# Patient Record
Sex: FEMALE | Race: WHITE
[De-identification: names, ages, dates, MRNs, and addresses within clinical notes are randomized per-mention and may not be internally consistent; named-entity substitution may affect disease eponyms.]

---

## 2018-01-14 ENCOUNTER — HOSPITAL ENCOUNTER (EMERGENCY)
Dept: HOSPITAL 17 - PHED | Age: 23
Discharge: HOME | End: 2018-01-14
Payer: COMMERCIAL

## 2018-01-14 VITALS — WEIGHT: 160.94 LBS | HEIGHT: 64 IN | BODY MASS INDEX: 27.48 KG/M2

## 2018-01-14 VITALS
RESPIRATION RATE: 16 BRPM | OXYGEN SATURATION: 97 % | SYSTOLIC BLOOD PRESSURE: 131 MMHG | DIASTOLIC BLOOD PRESSURE: 87 MMHG | HEART RATE: 126 BPM | TEMPERATURE: 98.5 F

## 2018-01-14 VITALS
SYSTOLIC BLOOD PRESSURE: 110 MMHG | RESPIRATION RATE: 17 BRPM | HEART RATE: 88 BPM | DIASTOLIC BLOOD PRESSURE: 73 MMHG | OXYGEN SATURATION: 98 %

## 2018-01-14 DIAGNOSIS — N28.1: ICD-10-CM

## 2018-01-14 DIAGNOSIS — B34.9: ICD-10-CM

## 2018-01-14 DIAGNOSIS — R11.2: Primary | ICD-10-CM

## 2018-01-14 DIAGNOSIS — D72.829: ICD-10-CM

## 2018-01-14 DIAGNOSIS — K21.9: ICD-10-CM

## 2018-01-14 LAB
ALBUMIN SERPL-MCNC: 3.8 GM/DL (ref 3.4–5)
ALP SERPL-CCNC: 73 U/L (ref 45–117)
ALT SERPL-CCNC: 18 U/L (ref 10–53)
AST SERPL-CCNC: 13 U/L (ref 15–37)
BASOPHILS # BLD AUTO: 0.3 TH/MM3 (ref 0–0.2)
BASOPHILS NFR BLD: 2.3 % (ref 0–2)
BILIRUB SERPL-MCNC: 0.5 MG/DL (ref 0.2–1)
BUN SERPL-MCNC: 16 MG/DL (ref 7–18)
CALCIUM SERPL-MCNC: 8.9 MG/DL (ref 8.5–10.1)
CHLORIDE SERPL-SCNC: 106 MEQ/L (ref 98–107)
COLOR UR: YELLOW
CREAT SERPL-MCNC: 0.93 MG/DL (ref 0.5–1)
EOSINOPHIL # BLD: 0.1 TH/MM3 (ref 0–0.4)
EOSINOPHIL NFR BLD: 0.4 % (ref 0–4)
ERYTHROCYTE [DISTWIDTH] IN BLOOD BY AUTOMATED COUNT: 12.4 % (ref 11.6–17.2)
GFR SERPLBLD BASED ON 1.73 SQ M-ARVRAT: 74 ML/MIN (ref 89–?)
GLUCOSE SERPL-MCNC: 126 MG/DL (ref 74–106)
GLUCOSE UR STRIP-MCNC: (no result) MG/DL
HCO3 BLD-SCNC: 23.9 MEQ/L (ref 21–32)
HCT VFR BLD CALC: 47.2 % (ref 35–46)
HGB BLD-MCNC: 15.8 GM/DL (ref 11.6–15.3)
HGB UR QL STRIP: (no result)
KETONES UR STRIP-MCNC: (no result) MG/DL
LEUKOCYTE ESTERASE UR QL STRIP: (no result) /HPF (ref 0–5)
LIPASE: 106 U/L (ref 73–393)
LYMPHOCYTES # BLD AUTO: 0.4 TH/MM3 (ref 1–4.8)
LYMPHOCYTES NFR BLD AUTO: 2.9 % (ref 9–44)
MCH RBC QN AUTO: 29.7 PG (ref 27–34)
MCHC RBC AUTO-ENTMCNC: 33.4 % (ref 32–36)
MCV RBC AUTO: 88.8 FL (ref 80–100)
MONOCYTE #: 0.4 TH/MM3 (ref 0–0.9)
MONOCYTES NFR BLD: 3.4 % (ref 0–8)
NEUTROPHILS # BLD AUTO: 11.8 TH/MM3 (ref 1.8–7.7)
NEUTROPHILS NFR BLD AUTO: 91 % (ref 16–70)
NITRITE UR QL STRIP: (no result)
PLATELET # BLD: 275 TH/MM3 (ref 150–450)
PMV BLD AUTO: 9.2 FL (ref 7–11)
PROT SERPL-MCNC: 7.9 GM/DL (ref 6.4–8.2)
RBC # BLD AUTO: 5.31 MIL/MM3 (ref 4–5.3)
RBC #/AREA URNS HPF: (no result) /HPF (ref 0–3)
SODIUM SERPL-SCNC: 139 MEQ/L (ref 136–145)
SP GR UR STRIP: 1.02 (ref 1–1.03)
SQUAMOUS #/AREA URNS HPF: (no result) /HPF (ref 0–5)
URINE LEUKOCYTE ESTERASE: (no result)
WBC # BLD AUTO: 13 TH/MM3 (ref 4–11)

## 2018-01-14 PROCEDURE — 85025 COMPLETE CBC W/AUTO DIFF WBC: CPT

## 2018-01-14 PROCEDURE — 80053 COMPREHEN METABOLIC PANEL: CPT

## 2018-01-14 PROCEDURE — 74177 CT ABD & PELVIS W/CONTRAST: CPT

## 2018-01-14 PROCEDURE — 87804 INFLUENZA ASSAY W/OPTIC: CPT

## 2018-01-14 PROCEDURE — 96372 THER/PROPH/DIAG INJ SC/IM: CPT

## 2018-01-14 PROCEDURE — 96374 THER/PROPH/DIAG INJ IV PUSH: CPT

## 2018-01-14 PROCEDURE — 99285 EMERGENCY DEPT VISIT HI MDM: CPT

## 2018-01-14 PROCEDURE — 84703 CHORIONIC GONADOTROPIN ASSAY: CPT

## 2018-01-14 PROCEDURE — 83690 ASSAY OF LIPASE: CPT

## 2018-01-14 PROCEDURE — 81001 URINALYSIS AUTO W/SCOPE: CPT

## 2018-01-14 PROCEDURE — 96361 HYDRATE IV INFUSION ADD-ON: CPT

## 2018-01-14 NOTE — RADRPT
EXAM DATE/TIME:  01/14/2018 10:02 

 

[facility] COMPARISON:     

No previous studies available for comparison.

 

 

INDICATIONS :     

Nausea, vomiting and diarrhea.

                      

 

IV CONTRAST:     

90 cc Omnipaque 350 (iohexol) IV 

 

 

ORAL CONTRAST:      

No oral contrast ingested.

                      

 

RADIATION DOSE:     

12.50 CTDIvol (mGy) 

 

 

MEDICAL HISTORY :     

Gastroesophageal reflux disease.  

 

SURGICAL HISTORY :      

None. 

 

ENCOUNTER:      

Initial

 

ACUITY:      

1 day

 

PAIN SCALE:      

0/10

 

LOCATION:         

abdomen

 

TECHNIQUE:     

Volumetric scanning of the abdomen and pelvis was performed.  Using automated exposure control and ad
justment of the mA and/or kV according to patient size, radiation dose was kept as low as reasonably 
achievable to obtain optimal diagnostic quality images.  DICOM format image data is available electro
nically for review and comparison.  

 

FINDINGS:     

 

LOWER LUNGS:     

The visualized lower lungs are clear.

 

LIVER:     

Homogeneous density without lesion.  There is no dilation of the biliary tree.  No calcified gallston
es.

 

SPLEEN:     

Normal size without lesion.

 

PANCREAS:     

Within normal limits.

 

KIDNEYS:     

2.4 x 1.8 cm triangular-shaped intermediate density mass of the lateral midpole of the right kidney. 
Kidneys otherwise within normal limits. No evidence of hydronephrosis.

 

ADRENAL GLANDS:     

Within normal limits.

 

VASCULAR:     

There is no aortic aneurysm.

 

BOWEL/MESENTERY:     

No evidence of bowel dilatation. No free air or free fluid. Appendix within normal limits.

 

ABDOMINAL WALL:     

Within normal limits.

 

RETROPERITONEUM:     

There is no lymphadenopathy. 

 

BLADDER:     

No wall thickening or mass. 

 

REPRODUCTIVE:     

Within normal limits.

 

INGUINAL:     

There is no lymphadenopathy or hernia. 

 

MUSCULOSKELETAL:     

Within normal limits for patient age. 

 

CONCLUSION:     

1. No acute findings in the abdomen and pelvis.

2. 2 cm intermediate density mass in the midpole the right kidney. Most likely etiology is a complex 
cyst. Recommend a routine followup renal ultrasound for further evaluation.

 

 

 

 Anthony Morales MD on January 14, 2018 at 10:14           

Board Certified Radiologist.

 This report was verified electronically.

## 2018-01-14 NOTE — PD
HPI


Chief Complaint:  GI Complaint


Time Seen by Provider:  08:19


Travel History


International Travel<30 days:  No


Contact w/Intl Traveler<30days:  No


Traveled to known affect area:  No





History of Present Illness


HPI


23yo F presented to the ED with nausea, vomiting and diarrhea. Pt states that 

after dinner last night she started to feel nausea, but per her mother she is 

always nausea after eating. Pt took some Dramamine for relief and went to bed. 

About 1:00am, she awoke all of the sudden with vomiting and diarrhea. She 

reports at least 9 episodes of N/V and diarrhea since that time. She attempted 

to take Pepto Bismol but was unable to keep it down. She reports exposure to 

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 She also has 

a medical history of GERD, that is not treated. She reports chills, hot flashes

, headache and muscle aches. She denies any hematemesis, coffee ground emesis, 

hematochezia, melena, abdominal pain, cough, sore throat, lightheadedness  or 

dizziness. She also denies any exposure to abnormal foods.  





Modifying Factors: None


Associated Signs & Symptoms: Nausea, vomiting, diarrhea


Risk Factors: Contact with influenza





PFSH


Past Medical History


Cancer:  No


Diabetes:  No


Hepatitis:  No


Hiatal Hernia:  No


Immunizations Current:  Yes


Thyroid Disease:  No


Pregnant?:  Not Pregnant


LMP:  NOW





Past Surgical History


Pacemaker:  No





Social History


Alcohol Use:  No


Tobacco Use:  No





Allergies-Medications


(Allergen,Severity, Reaction):  


Coded Allergies:  


     No Known Allergies (Verified  Adverse Reaction, Unknown, 1/14/18)


Reported Meds & Prescriptions





Reported Meds & Active Scripts


Active


Reported


Birth Control Pills (Miscellaneous Medication)  Tab 1 Tab PO DAILY 








Review of Systems


Except as stated in HPI:  all other systems reviewed are Neg


General / Constitutional:  Positive: Chills


HENT:  Positive: Headaches


Gastrointestinal:  Positive: Nausea, Vomiting, Diarrhea, Indigestion





Physical Exam


Narrative


GENERAL: 23yo F who is well-developed and well nourished. Appears fatigued, in 

mild distress. Alert and oriented x3. 


SKIN: Warm and dry.


HEAD: Atraumatic. Normocephalic. 


NECK: Trachea midline. No JVD.  Supple.


ENT: Mucosa pink and moist.  Mild erythema with no exudates. No uvular edema. 

No uvular, palatal, or tonsillar deviation. Airway patent. 


CARDIOVASCULAR: Tachycardic with normal rhythm. 


RESPIRATORY: No accessory muscle use. Clear to auscultation. Breath sounds 

equal bilaterally. 


GASTROINTESTINAL: Abdomen soft, non-tender, nondistended. Hepatic and splenic 

margins not palpable. Active bowel sounds.  Benign.


MUSCULOSKELETAL: Extremities without clubbing, cyanosis, or edema. No obvious 

deformities. 


NEUROLOGICAL: Awake and alert. No obvious cranial nerve deficits.  Motor 

grossly within normal limits.  Normal speech.


PSYCHIATRIC: Appropriate mood and affect; insight and judgment normal.





Data


Data


Last Documented VS





Vital Signs








  Date Time  Temp Pulse Resp B/P (MAP) Pulse Ox O2 Delivery O2 Flow Rate FiO2


 


1/14/18 09:01  88 17 110/73 (85) 98 Room Air  


 


1/14/18 07:58 98.5       








Orders





 Orders


Complete Blood Count With Diff (1/14/18 08:10)


Comprehensive Metabolic Panel (1/14/18 08:10)


Lipase (1/14/18 08:10)


Urinalysis - C+S If Indicated (1/14/18 08:10)


Iv Access Insert/Monitor (1/14/18 08:10)


Ecg Monitoring (1/14/18 08:10)


Oximetry (1/14/18 08:10)


Sodium Chloride 0.9% Flush (Ns Flush) (1/14/18 08:15)


Ed Urine Pregnancytest Poc (1/14/18 08:10)


Sodium Chlor 0.9% 1000 Ml Inj (Ns 1000 M (1/14/18 08:15)


Ondansetron Inj (Zofran Inj) (1/14/18 08:15)


Influenzae A/B Antigen (1/14/18 08:20)


Ibuprofen (Motrin) (1/14/18 09:15)


Ct Abd/Pel W Iv Contrast(Rout) (1/14/18 09:32)


Iohexol 350 Inj (Omnipaque 350 Inj) (1/14/18 10:11)


Promethazine Inj (Phenergan Inj) (1/14/18 10:30)





Labs





Laboratory Tests








Test


  1/14/18


08:12 1/14/18


08:23


 


White Blood Count 13.0 TH/MM3  


 


Red Blood Count 5.31 MIL/MM3  


 


Hemoglobin 15.8 GM/DL  


 


Hematocrit 47.2 %  


 


Mean Corpuscular Volume 88.8 FL  


 


Mean Corpuscular Hemoglobin 29.7 PG  


 


Mean Corpuscular Hemoglobin


Concent 33.4 % 


  


 


 


Red Cell Distribution Width 12.4 %  


 


Platelet Count 275 TH/MM3  


 


Mean Platelet Volume 9.2 FL  


 


Neutrophils (%) (Auto) 91.0 %  


 


Lymphocytes (%) (Auto) 2.9 %  


 


Monocytes (%) (Auto) 3.4 %  


 


Eosinophils (%) (Auto) 0.4 %  


 


Basophils (%) (Auto) 2.3 %  


 


Neutrophils # (Auto) 11.8 TH/MM3  


 


Lymphocytes # (Auto) 0.4 TH/MM3  


 


Monocytes # (Auto) 0.4 TH/MM3  


 


Eosinophils # (Auto) 0.1 TH/MM3  


 


Basophils # (Auto) 0.3 TH/MM3  


 


CBC Comment DIFF FINAL  


 


Differential Comment   


 


Urine Collection Type  CLEAN CATCH 


 


Urine Color  YELLOW 


 


Urine Turbidity  CLEAR 


 


Urine pH  5.5 


 


Urine Specific Gravity  1.024 


 


Urine Protein  NEG mg/dL 


 


Urine Glucose (UA)  NEG mg/dL 


 


Urine Ketones  NEG mg/dL 


 


Urine Occult Blood  LARGE 


 


Urine Nitrite  NEG 


 


Urine Bilirubin  NEG 


 


Urine Leukocyte Esterase  NEG 


 


Urine RBC  4-9 /hpf 


 


Urine WBC  0-2 /hpf 


 


Urine Squamous Epithelial


Cells 


  0-5 /hpf 


 


 


Urine Mucus   /lpf 


 


Microscopic Urinalysis Comment


  


  CULT NOT


INDICATED


 


Urine Collection Time  08:23 


 


Blood Urea Nitrogen  16 MG/DL 


 


Creatinine  0.93 MG/DL 


 


Random Glucose  126 MG/DL 


 


Total Protein  7.9 GM/DL 


 


Albumin  3.8 GM/DL 


 


Calcium Level  8.9 MG/DL 


 


Alkaline Phosphatase  73 U/L 


 


Aspartate Amino Transf


(AST/SGOT) 


  13 U/L 


 


 


Alanine Aminotransferase


(ALT/SGPT) 


  18 U/L 


 


 


Total Bilirubin  0.5 MG/DL 


 


Sodium Level  139 MEQ/L 


 


Potassium Level  3.5 MEQ/L 


 


Chloride Level  106 MEQ/L 


 


Carbon Dioxide Level  23.9 MEQ/L 


 


Anion Gap  9 MEQ/L 


 


Lipase  106 U/L 











MDM


Medical Decision Making


Medical Screen Exam Complete:  Yes


Emergency Medical Condition:  Yes


Medical Record Reviewed:  Yes


Interpretation(s)





Laboratory Tests








Test


  1/14/18


08:12 1/14/18


08:23


 


White Blood Count


  13.0 TH/MM3


(4.0-11.0) 


 


 


Red Blood Count


  5.31 MIL/MM3


(4.00-5.30) 


 


 


Hemoglobin


  15.8 GM/DL


(11.6-15.3) 


 


 


Hematocrit


  47.2 %


(35.0-46.0) 


 


 


Neutrophils (%) (Auto)


  91.0 %


(16.0-70.0) 


 


 


Lymphocytes (%) (Auto)


  2.9 %


(9.0-44.0) 


 


 


Basophils (%) (Auto)


  2.3 %


(0.0-2.0) 


 


 


Neutrophils # (Auto)


  11.8 TH/MM3


(1.8-7.7) 


 


 


Lymphocytes # (Auto)


  0.4 TH/MM3


(1.0-4.8) 


 


 


Basophils # (Auto)


  0.3 TH/MM3


(0-0.2) 


 


 


Urine Occult Blood  LARGE (NEG) 


 


Urine RBC  4-9 /hpf (0-3) 


 


Random Glucose


  


  126 MG/DL


()


 


Aspartate Amino Transf


(AST/SGOT) 


  13 U/L (15-37) 


 








Last 24 hours Impressions








Abdomen/Pelvis CT 1/14/18 0932 Signed





Impressions: 





 Service Date/Time:  Sunday, January 14, 2018 10:02 - CONCLUSION:  1. No acute 





 findings in the abdomen and pelvis. 2. 2 cm intermediate density mass in the 





 midpole the right kidney. Most likely etiology is a complex cyst. Recommend a 





 routine followup renal ultrasound for further evaluation.     Anthony Morales MD 








Differential Diagnosis


Influenza versus viral syndrome versus dehydration versus gastritis versus 

gastroenteritis versus pancreatitis versus sepsis versus pneumonia


Narrative Course


Influenza testing is negative.  Lab work did not indicate significant 

dehydration or metabolic issues.  CAT scan was done due to elevated white blood 

cell count and did not show any signs of acute intra-abdominal processes.  

Patient does have a right renal cyst that can be evaluated further with primary 

care physician through renal ultrasound.  Pulmonary exam is fairly unremarkable 

and I'm not suspecting a pneumonia in this case.  She has no meningeal signs, 

no neck stiffness, no photophobia.  I do not think that evaluation for 

meningitis is warranted in this case.  Considering her exposures, this is much 

more likely to be of viral etiology and influenza cannot be completely ruled 

out even with rapid flu test.  Considering symptoms and exposure, my plan would 

be to treat her for influenza.  We will also give her symptomatically relief 

with nausea medications.  Follow-up as necessary with primary care doctor.  

Return for worsening in symptoms as necessary.  The plan has been discussed 

with her and she states understanding.





Diagnosis





 Primary Impression:  


 Nausea and vomiting


***Med/Other Pt SpecificInfo:  Prescription(s) given


Scripts


Ibuprofen (Ibuprofen) 600 Mg Tab


600 MG PO Q6H Y for Pain/Inflammation, #20 TAB 0 Refills


   Prov: Axel Arrington MD         1/14/18 


Ondansetron Odt (Zofran Odt) 4 Mg Tab


4 MG SL Q6HR Y for Nausea/Vomiting, #7 TAB 0 Refills


   Prov: Axel Arrington MD         1/14/18 


Oseltamivir (Tamiflu) 75 Mg Cap


75 MG PO BID for Mgmt Viral Infection for 5 Days, #10 CAP 0 Refills


   Prov: Axel Arrington MD         1/14/18


Disposition:  01 DISCHARGE HOME


Condition:  Stable











Axel Arrington MD Jan 14, 2018 08:32